# Patient Record
Sex: FEMALE | Race: WHITE
[De-identification: names, ages, dates, MRNs, and addresses within clinical notes are randomized per-mention and may not be internally consistent; named-entity substitution may affect disease eponyms.]

---

## 2019-03-15 ENCOUNTER — HOSPITAL ENCOUNTER (EMERGENCY)
Dept: HOSPITAL 94 - ER | Age: 49
Discharge: HOME | End: 2019-03-15
Payer: MEDICAID

## 2019-03-15 VITALS — SYSTOLIC BLOOD PRESSURE: 171 MMHG | DIASTOLIC BLOOD PRESSURE: 84 MMHG

## 2019-03-15 VITALS — WEIGHT: 216.05 LBS | BODY MASS INDEX: 34.72 KG/M2 | HEIGHT: 66 IN

## 2019-03-15 DIAGNOSIS — E11.9: ICD-10-CM
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
## 2019-03-15 DIAGNOSIS — Z79.899: ICD-10-CM
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
## 2019-03-15 DIAGNOSIS — S00.412A: Primary | ICD-10-CM
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
## 2019-03-15 DIAGNOSIS — Z88.8: ICD-10-CM

## 2019-03-15 DIAGNOSIS — Z88.5: ICD-10-CM

## 2019-03-15 PROCEDURE — 99282 EMERGENCY DEPT VISIT SF MDM: CPT

## 2020-12-02 LAB
ALBUMIN SERPL BCP-MCNC: 3.6 G/DL (ref 3.4–5)
ALBUMIN/GLOB SERPL: 1 {RATIO} (ref 1.1–1.5)
ALP SERPL-CCNC: 90 IU/L (ref 46–116)
ALT SERPL W P-5'-P-CCNC: 35 U/L (ref 30–65)
ANION GAP SERPL CALCULATED.3IONS-SCNC: 11 MMOL/L (ref 8–16)
AST SERPL W P-5'-P-CCNC: 36 U/L (ref 10–37)
BASOPHILS # BLD AUTO: 0 X10'3 (ref 0–0.2)
BASOPHILS NFR BLD AUTO: 0.9 % (ref 0–1)
BILIRUB SERPL-MCNC: 0.6 MG/DL (ref 0–1)
BUN SERPL-MCNC: 17 MG/DL (ref 7–18)
BUN/CREAT SERPL: 28.8 (ref 6.6–38)
CALCIUM SERPL-MCNC: 8.6 MG/DL (ref 8.5–10.1)
CHLORIDE SERPL-SCNC: 105 MMOL/L (ref 99–107)
CO2 SERPL-SCNC: 24.4 MMOL/L (ref 24–32)
CREAT SERPL-MCNC: 0.59 MG/DL (ref 0.4–0.9)
EOSINOPHIL # BLD AUTO: 0.1 X10'3 (ref 0–0.9)
EOSINOPHIL NFR BLD AUTO: 1.7 % (ref 0–6)
ERYTHROCYTE [DISTWIDTH] IN BLOOD BY AUTOMATED COUNT: 13.4 % (ref 11.5–14.5)
GFR SERPL CREATININE-BSD FRML MDRD: > 90 ML/MIN
GLUCOSE SERPL-MCNC: 210 MG/DL (ref 70–104)
HCT VFR BLD AUTO: 41.6 % (ref 35–45)
HGB BLD-MCNC: 14.6 G/DL (ref 12–16)
LYMPHOCYTES # BLD AUTO: 1.8 X10'3 (ref 1.1–4.8)
LYMPHOCYTES NFR BLD AUTO: 45.3 % (ref 21–51)
MCH RBC QN AUTO: 33 PG (ref 27–31)
MCHC RBC AUTO-ENTMCNC: 35.1 G/DL (ref 33–36.5)
MCV RBC AUTO: 93.8 FL (ref 78–98)
MONOCYTES # BLD AUTO: 0.4 X10'3 (ref 0–0.9)
MONOCYTES NFR BLD AUTO: 9.6 % (ref 2–12)
NEUTROPHILS # BLD AUTO: 1.6 X10'3 (ref 1.8–7.7)
NEUTROPHILS NFR BLD AUTO: 42.5 % (ref 42–75)
PLATELET # BLD AUTO: 108 X10'3 (ref 140–440)
PMV BLD AUTO: 9.1 FL (ref 7.4–10.4)
POTASSIUM SERPL-SCNC: 3.6 MMOL/L (ref 3.4–5.1)
PROT SERPL-MCNC: 7.2 G/DL (ref 6.4–8.2)
RBC # BLD AUTO: 4.43 X10'6 (ref 4.2–5.6)
SODIUM SERPL-SCNC: 140 MMOL/L (ref 135–145)

## 2020-12-10 ENCOUNTER — HOSPITAL ENCOUNTER (OUTPATIENT)
Dept: HOSPITAL 94 - PAS | Age: 50
Discharge: HOME | End: 2020-12-10
Attending: ORTHOPAEDIC SURGERY
Payer: MEDICAID

## 2020-12-10 VITALS — DIASTOLIC BLOOD PRESSURE: 69 MMHG | SYSTOLIC BLOOD PRESSURE: 135 MMHG

## 2020-12-10 VITALS — DIASTOLIC BLOOD PRESSURE: 62 MMHG | SYSTOLIC BLOOD PRESSURE: 134 MMHG

## 2020-12-10 VITALS — DIASTOLIC BLOOD PRESSURE: 92 MMHG | SYSTOLIC BLOOD PRESSURE: 152 MMHG

## 2020-12-10 VITALS — SYSTOLIC BLOOD PRESSURE: 145 MMHG | DIASTOLIC BLOOD PRESSURE: 63 MMHG

## 2020-12-10 VITALS — HEIGHT: 66.25 IN | WEIGHT: 233.69 LBS | BODY MASS INDEX: 37.56 KG/M2

## 2020-12-10 VITALS — DIASTOLIC BLOOD PRESSURE: 68 MMHG | SYSTOLIC BLOOD PRESSURE: 139 MMHG

## 2020-12-10 DIAGNOSIS — S83.231A: Primary | ICD-10-CM

## 2020-12-10 DIAGNOSIS — G89.18: ICD-10-CM
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
## 2020-12-10 DIAGNOSIS — E66.01: ICD-10-CM

## 2020-12-10 DIAGNOSIS — Z87.891: ICD-10-CM

## 2020-12-10 DIAGNOSIS — Z79.899: ICD-10-CM

## 2020-12-10 DIAGNOSIS — M94.261: ICD-10-CM

## 2020-12-10 DIAGNOSIS — K21.9: ICD-10-CM

## 2020-12-10 DIAGNOSIS — Z90.49: ICD-10-CM
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
## 2020-12-10 DIAGNOSIS — Z98.890: ICD-10-CM

## 2020-12-10 DIAGNOSIS — Z79.84: ICD-10-CM

## 2020-12-10 DIAGNOSIS — Z88.5: ICD-10-CM
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
## 2020-12-10 DIAGNOSIS — Z90.710: ICD-10-CM

## 2020-12-10 DIAGNOSIS — Z88.8: ICD-10-CM

## 2020-12-10 DIAGNOSIS — F32.9: ICD-10-CM

## 2020-12-10 DIAGNOSIS — E11.59: ICD-10-CM

## 2020-12-10 DIAGNOSIS — J45.909: ICD-10-CM

## 2020-12-10 DIAGNOSIS — F41.9: ICD-10-CM

## 2020-12-10 DIAGNOSIS — Z20.828: ICD-10-CM

## 2020-12-10 DIAGNOSIS — M17.0: ICD-10-CM

## 2020-12-10 DIAGNOSIS — S83.271A: ICD-10-CM

## 2020-12-10 PROCEDURE — 29873 ARTHRS KNEE SURG W/LAT RLS: CPT

## 2020-12-10 PROCEDURE — 80053 COMPREHEN METABOLIC PANEL: CPT

## 2020-12-10 PROCEDURE — 29880 ARTHRS KNE SRG MNISECTMY M&L: CPT

## 2020-12-10 PROCEDURE — 76937 US GUIDE VASCULAR ACCESS: CPT

## 2020-12-10 PROCEDURE — 82948 REAGENT STRIP/BLOOD GLUCOSE: CPT

## 2020-12-10 PROCEDURE — 85025 COMPLETE CBC W/AUTO DIFF WBC: CPT

## 2020-12-10 PROCEDURE — 36415 COLL VENOUS BLD VENIPUNCTURE: CPT

## 2020-12-10 PROCEDURE — 76942 ECHO GUIDE FOR BIOPSY: CPT

## 2020-12-10 PROCEDURE — 64450 NJX AA&/STRD OTHER PN/BRANCH: CPT

## 2020-12-10 PROCEDURE — 87635 SARS-COV-2 COVID-19 AMP PRB: CPT

## 2020-12-10 PROCEDURE — 29879 ARTHRS KNE SRG ABRASJ ARTHRP: CPT

## 2020-12-10 NOTE — NUR
Received from [place] via , accompanied by Anesthesiologist DR PIÑA and report given by 
Anesthesiolgist. AWAKENS TO VOICE. VITAL;S STABLE. DRESSING DI. JUAN CARLOS PAIN.

## 2021-06-21 ENCOUNTER — HOSPITAL ENCOUNTER (EMERGENCY)
Dept: HOSPITAL 94 - ER | Age: 51
Discharge: LEFT BEFORE DISCHARGE | End: 2021-06-21
Payer: MEDICAID

## 2021-06-21 VITALS — WEIGHT: 227.08 LBS | BODY MASS INDEX: 35.64 KG/M2 | HEIGHT: 67 IN

## 2021-06-21 VITALS — SYSTOLIC BLOOD PRESSURE: 166 MMHG | DIASTOLIC BLOOD PRESSURE: 92 MMHG

## 2021-06-21 DIAGNOSIS — E11.8: Primary | ICD-10-CM

## 2021-06-21 DIAGNOSIS — Z53.21: ICD-10-CM

## 2021-06-21 PROCEDURE — 82948 REAGENT STRIP/BLOOD GLUCOSE: CPT
